# Patient Record
Sex: FEMALE | Race: BLACK OR AFRICAN AMERICAN | NOT HISPANIC OR LATINO | URBAN - METROPOLITAN AREA
[De-identification: names, ages, dates, MRNs, and addresses within clinical notes are randomized per-mention and may not be internally consistent; named-entity substitution may affect disease eponyms.]

---

## 2024-11-21 ENCOUNTER — OFFICE VISIT (OUTPATIENT)
Dept: URGENT CARE | Facility: CLINIC | Age: 44
End: 2024-11-21
Payer: COMMERCIAL

## 2024-11-21 VITALS
TEMPERATURE: 99 F | RESPIRATION RATE: 16 BRPM | HEIGHT: 63 IN | WEIGHT: 145 LBS | OXYGEN SATURATION: 98 % | DIASTOLIC BLOOD PRESSURE: 93 MMHG | HEART RATE: 74 BPM | BODY MASS INDEX: 25.69 KG/M2 | SYSTOLIC BLOOD PRESSURE: 142 MMHG

## 2024-11-21 DIAGNOSIS — N93.9 VAGINAL BLEEDING: ICD-10-CM

## 2024-11-21 DIAGNOSIS — Z20.2 POSSIBLE EXPOSURE TO STD: Primary | ICD-10-CM

## 2024-11-21 DIAGNOSIS — N94.9 GENITAL LESION, FEMALE: ICD-10-CM

## 2024-11-21 DIAGNOSIS — N89.8 VAGINAL DISCHARGE: ICD-10-CM

## 2024-11-21 DIAGNOSIS — J02.9 SORE THROAT: ICD-10-CM

## 2024-11-21 LAB
BILIRUBIN, UA POC OHS: NEGATIVE
BLOOD, UA POC OHS: ABNORMAL
CLARITY, UA POC OHS: CLEAR
COLOR, UA POC OHS: COLORLESS
GLUCOSE, UA POC OHS: NEGATIVE
KETONES, UA POC OHS: NEGATIVE
LEUKOCYTES, UA POC OHS: NEGATIVE
NITRITE, UA POC OHS: NEGATIVE
PH, UA POC OHS: 6.5
PROTEIN, UA POC OHS: NEGATIVE
SPECIFIC GRAVITY, UA POC OHS: 1.01
UROBILINOGEN, UA POC OHS: 0.2

## 2024-11-21 RX ORDER — CEFTRIAXONE 500 MG/1
500 INJECTION, POWDER, FOR SOLUTION INTRAMUSCULAR; INTRAVENOUS
Status: COMPLETED | OUTPATIENT
Start: 2024-11-21 | End: 2024-11-21

## 2024-11-21 RX ORDER — LIDOCAINE HYDROCHLORIDE 10 MG/ML
1 INJECTION, SOLUTION INFILTRATION; PERINEURAL
Status: COMPLETED | OUTPATIENT
Start: 2024-11-21 | End: 2024-11-21

## 2024-11-21 RX ORDER — DOXYCYCLINE 100 MG/1
100 CAPSULE ORAL 2 TIMES DAILY
Qty: 14 CAPSULE | Refills: 0 | Status: SHIPPED | OUTPATIENT
Start: 2024-11-21 | End: 2024-11-28

## 2024-11-21 RX ADMIN — CEFTRIAXONE 500 MG: 500 INJECTION, POWDER, FOR SOLUTION INTRAMUSCULAR; INTRAVENOUS at 09:11

## 2024-11-21 RX ADMIN — LIDOCAINE HYDROCHLORIDE 1 ML: 10 INJECTION, SOLUTION INFILTRATION; PERINEURAL at 09:11

## 2024-11-21 NOTE — PATIENT INSTRUCTIONS
Please follow up with your primary care provider within 2-5 days if your signs and symptoms have not resolved or worsen.     If your condition worsens or fails to improve we recommend that you receive another evaluation at the emergency room immediately or contact your primary medical clinic to discuss your concerns.   You must understand that you have received an Urgent Care treatment only and that you may be released before all of your medical problems are known or treated. You, the patient, will arrange for follow up care as instructed.     We will contact you in a few days with the results.      Increase condom use to prevent further occurance.  Notify sexual partners of the need for testing.  Complete ALL medication prescribed.  NO sexual intercourse for 7 days after treatment. Retest to ensure infection has cleared-there are infections that require more agressive treatment.  Retest for all in 6 weeks and again in 6 months to ensure true negative results.  Today's testing will give no crediable information if you have unprotected sexual activities going forward. Syphillis cases are rising!  Gonorrhea has RESISTANT strains which is why repeat testing after treatment is important.  Gonorrhea may be present in multiple sites from just ONE area of exposure.  For those who have high risk sexual behaviors and are on Truvada for PrEP- you have additional protection against HIV ONLY.    REMEMBER USE CONDOMS AND GET TESTED OFTEN.   You can get a reliable test result:   2 weeks: trichomonas, gonorrhea and chlamydia (and a pregnancy test too!)   1 week to 3 months: syphilis (RPR)  6 weeks to 3 months: HIV, Herpes, hepatitis C and B.    If you have negative tests please make sure to repeat testing in 3-6 months.

## 2024-11-21 NOTE — PROGRESS NOTES
"Subjective:      Patient ID: Marychuy Mcclendon is a 44 y.o. female.    Vitals:  height is 5' 3" (1.6 m) and weight is 65.8 kg (145 lb). Her oral temperature is 98.7 °F (37.1 °C). Her blood pressure is 142/93 (abnormal) and her pulse is 74. Her respiration is 16 and oxygen saturation is 98%.     Chief Complaint: Vaginal Discharge    Patient complaints: concerned about having an STD  -last month noticed external bumps on the external side of her vagina, dysuria and was also having white discharge; she self-treated with OTC yeast medication, cranberry juice and AZO; her symptoms resolved  - 4 days ago she started having an irritation in her throat and noticed patchy white on her tonsils; 2 days ago she noticed the vaginal bumps again and now has bloody discharge  -hx of STD in 2001 but doesn't remember what it was  - she used OTC yeast infection meds yesterday  -having vaginal irritation  -she states that she had her period a few weeks ago  -she admits to having oral sex    Vaginal Discharge  The patient's primary symptoms include genital itching, genital lesions and vaginal discharge. The patient's pertinent negatives include no genital odor. This is a recurrent problem. The current episode started in the past 7 days. The problem occurs constantly. The problem has been gradually worsening. The patient is experiencing no pain. She is not pregnant. Associated symptoms include rash and a sore throat. Pertinent negatives include no chills, dysuria, fever or hematuria. The vaginal discharge was bloody and white. The vaginal bleeding is spotting. She has not been passing clots. She has not been passing tissue. She has tried nothing for the symptoms. She is sexually active. It is unknown whether or not her partner has an STD. She uses nothing for contraception. Her past medical history is significant for an STD.       Constitution: Negative for chills and fever.   HENT:  Positive for sore throat and trouble swallowing.  "   Genitourinary:  Positive for vaginal discharge and genital sore. Negative for dysuria, hematuria and vaginal odor.   Skin:  Positive for rash.      Objective:     Physical Exam   HENT:   Mouth/Throat: Uvula is midline and mucous membranes are normal. Oropharyngeal exudate and posterior oropharyngeal erythema present. Tonsillar exudate.   White patches seen at the back of the pharynx      Comments: White patches seen at the back of the pharynx  Genitourinary:    Right adnexa and rectum normal.   There is lesion on the left labia. Cervix exhibits discharge.    Vaginal discharge and bleeding present.   There is bleeding in the vagina.         Comments: Blood thin vaginal discharge seen   Chaperone present: Reny RT.       Results for orders placed or performed in visit on 11/21/24   POCT Urinalysis(Instrument)    Collection Time: 11/21/24 10:19 AM   Result Value Ref Range    Color, POC UA Colorless Yellow, Straw, Colorless    Clarity, POC UA Clear Clear    Glucose, POC UA Negative Negative    Bilirubin, POC UA Negative Negative    Ketones, POC UA Negative Negative    Spec Grav POC UA 1.010 1.005 - 1.030    Blood, POC UA Trace-intact (A) Negative    pH, POC UA 6.5 5.0 - 8.0    Protein, POC UA Negative Negative    Urobilinogen, POC UA 0.2 <=1.0    Nitrite, POC UA Negative Negative    WBC, POC UA Negative Negative       Assessment:     1. Possible exposure to STD    2. Sore throat    3. Vaginal discharge    4. Vaginal bleeding    5. Genital lesion, female        Plan:       Possible exposure to STD  -     SureSwab(R)Chlamydia/N.Gonorrhoeae RNA,TMA  -     cefTRIAXone injection 500 mg  -     LIDOcaine HCL 10 mg/ml (1%) injection 1 mL  -     doxycycline (VIBRAMYCIN) 100 MG Cap; Take 1 capsule (100 mg total) by mouth 2 (two) times daily. for 7 days  Dispense: 14 capsule; Refill: 0  -     POCT Urinalysis(Instrument)    Sore throat  -     SureSwab(R)Chlamydia/N.Gonorrhoeae RNA,TMA  -     POCT  Urinalysis(Instrument)    Vaginal discharge  -     SureSwab(R)Chlamydia/N.Gonorrhoeae RNA,TMA  -     POCT Urinalysis(Instrument)    Vaginal bleeding  -     SureSwab(R)Chlamydia/N.Gonorrhoeae RNA,TMA  -     POCT Urinalysis(Instrument)    Genital lesion, female  -     HSV by Rapid PCR, Non-Blood Other; Swab; Future; Expected date: 11/21/2024  -     POCT Urinalysis(Instrument)    Sore throat- gonorrhea vs yeast      Medical Decision Making:   Differential Diagnosis:   Sore throat- gonorrhea versus Candida  Genital lesion- HSV versus folliculitis versus benign lesion  Clinical Tests:   Lab Tests: Ordered and Reviewed  The following lab test(s) were unremarkable: Urinalysis       <> Summary of Lab: GC ordered  HSV ordered    Urgent Care Management:  Patient complaints: concerned about having an STD  -last month noticed external bumps on the external side of her vagina, dysuria and was also having white discharge; she self-treated with OTC yeast medication, cranberry juice and AZO; her symptoms resolved  - 4 days ago she started having an irritation in her throat and noticed patchy white on her tonsils; 2 days ago she noticed the vaginal bumps again and now has bloody discharge  -hx of STD in 2001 but doesn't remember what it was  - she used OTC yeast infection meds yesterday  -having vaginal irritation  -she states that she had her period a few weeks ago  -she admits to having oral sex.  Vitals are within normal limits.  Physical Exam   HENT:   Mouth/Throat: Uvula is midline and mucous membranes are normal. Oropharyngeal exudate and posterior oropharyngeal erythema present. Tonsillar exudate.   White patches seen at the back of the pharynx      Comments: White patches seen at the back of the pharynx  Genitourinary:    Right adnexa and rectum normal.   There is lesion on the left labia. Cervix exhibits discharge.    Vaginal discharge and bleeding present.   There is bleeding in the vagina.         Comments: Blood thin  vaginal discharge seen   Chaperone present: RT Reny.   The patient was treated for suspected gonorrhea and chlamydia.  I will follow up with the patient once results come back.  ED and return precautions were given. The pt vu.       Please follow up with your primary care provider within 2-5 days if your signs and symptoms have not resolved or worsen.     If your condition worsens or fails to improve we recommend that you receive another evaluation at the emergency room immediately or contact your primary medical clinic to discuss your concerns.   You must understand that you have received an Urgent Care treatment only and that you may be released before all of your medical problems are known or treated. You, the patient, will arrange for follow up care as instructed.     We will contact you in a few days with the results.      Increase condom use to prevent further occurance.  Notify sexual partners of the need for testing.  Complete ALL medication prescribed.  NO sexual intercourse for 7 days after treatment. Retest to ensure infection has cleared-there are infections that require more agressive treatment.  Retest for all in 6 weeks and again in 6 months to ensure true negative results.  Today's testing will give no crediable information if you have unprotected sexual activities going forward. Syphillis cases are rising!  Gonorrhea has RESISTANT strains which is why repeat testing after treatment is important.  Gonorrhea may be present in multiple sites from just ONE area of exposure.  For those who have high risk sexual behaviors and are on Truvada for PrEP- you have additional protection against HIV ONLY.    REMEMBER USE CONDOMS AND GET TESTED OFTEN.   You can get a reliable test result:   2 weeks: trichomonas, gonorrhea and chlamydia (and a pregnancy test too!)   1 week to 3 months: syphilis (RPR)  6 weeks to 3 months: HIV, Herpes, hepatitis C and B.    If you have negative tests please make sure to repeat  testing in 3-6 months.

## 2024-11-23 ENCOUNTER — PATIENT MESSAGE (OUTPATIENT)
Dept: URGENT CARE | Facility: CLINIC | Age: 44
End: 2024-11-23
Payer: COMMERCIAL

## 2024-11-23 ENCOUNTER — TELEPHONE (OUTPATIENT)
Dept: URGENT CARE | Facility: CLINIC | Age: 44
End: 2024-11-23
Payer: COMMERCIAL

## 2024-11-23 DIAGNOSIS — B37.0 CANDIDA INFECTION, ORAL: Primary | ICD-10-CM

## 2024-11-23 LAB
APTIMA MEDIA TYPE: NORMAL
C. TRACHOMATIS DNA PROBE RESULT:: NEGATIVE
N. GONORRHOEAE DNA PROBE RESULT:: NEGATIVE
SPECIMEN SOURCE: NORMAL

## 2024-11-23 RX ORDER — NYSTATIN 100000 [USP'U]/ML
6 SUSPENSION ORAL 4 TIMES DAILY
Qty: 240 ML | Refills: 0 | Status: SHIPPED | OUTPATIENT
Start: 2024-11-23 | End: 2024-12-03

## 2024-11-23 NOTE — TELEPHONE ENCOUNTER
Left  informing the patient that I sent her a "[employer] message about some of her lab results and a second "[employer] message about a new medication sent to [facility] pharmacy. I asked her to call our clinic if she has any additional questions or concerns.

## 2024-11-25 ENCOUNTER — PATIENT MESSAGE (OUTPATIENT)
Dept: URGENT CARE | Facility: CLINIC | Age: 44
End: 2024-11-25
Payer: COMMERCIAL

## 2024-11-25 ENCOUNTER — TELEPHONE (OUTPATIENT)
Dept: URGENT CARE | Facility: CLINIC | Age: 44
End: 2024-11-25
Payer: COMMERCIAL

## 2024-12-19 ENCOUNTER — OFFICE VISIT (OUTPATIENT)
Dept: URGENT CARE | Facility: CLINIC | Age: 44
End: 2024-12-19
Payer: COMMERCIAL

## 2024-12-19 VITALS
SYSTOLIC BLOOD PRESSURE: 167 MMHG | WEIGHT: 145 LBS | DIASTOLIC BLOOD PRESSURE: 103 MMHG | TEMPERATURE: 98 F | BODY MASS INDEX: 26.68 KG/M2 | HEIGHT: 62 IN | OXYGEN SATURATION: 97 % | RESPIRATION RATE: 16 BRPM | HEART RATE: 65 BPM

## 2024-12-19 DIAGNOSIS — M54.6 ACUTE MIDLINE THORACIC BACK PAIN: Primary | ICD-10-CM

## 2024-12-19 PROCEDURE — 96372 THER/PROPH/DIAG INJ SC/IM: CPT | Mod: S$GLB,,, | Performed by: STUDENT IN AN ORGANIZED HEALTH CARE EDUCATION/TRAINING PROGRAM

## 2024-12-19 PROCEDURE — 99213 OFFICE O/P EST LOW 20 MIN: CPT | Mod: 25,S$GLB,, | Performed by: STUDENT IN AN ORGANIZED HEALTH CARE EDUCATION/TRAINING PROGRAM

## 2024-12-19 RX ORDER — KETOROLAC TROMETHAMINE 10 MG/1
10 TABLET, FILM COATED ORAL EVERY 6 HOURS PRN
Qty: 20 TABLET | Refills: 0 | Status: SHIPPED | OUTPATIENT
Start: 2024-12-19 | End: 2024-12-24

## 2024-12-19 RX ORDER — KETOROLAC TROMETHAMINE 30 MG/ML
30 INJECTION, SOLUTION INTRAMUSCULAR; INTRAVENOUS
Status: COMPLETED | OUTPATIENT
Start: 2024-12-19 | End: 2024-12-19

## 2024-12-19 RX ORDER — CYCLOBENZAPRINE HCL 10 MG
10 TABLET ORAL NIGHTLY PRN
Qty: 10 TABLET | Refills: 0 | Status: SHIPPED | OUTPATIENT
Start: 2024-12-19 | End: 2024-12-29

## 2024-12-19 RX ORDER — LIDOCAINE 50 MG/G
1 PATCH TOPICAL DAILY
Qty: 5 PATCH | Refills: 0 | Status: SHIPPED | OUTPATIENT
Start: 2024-12-19 | End: 2024-12-24

## 2024-12-19 RX ADMIN — KETOROLAC TROMETHAMINE 30 MG: 30 INJECTION, SOLUTION INTRAMUSCULAR; INTRAVENOUS at 01:12

## 2024-12-19 NOTE — PATIENT INSTRUCTIONS
Please follow up with your primary care provider within 2-5 days if your signs and symptoms have not resolved or worsen.     If your condition worsens or fails to improve we recommend that you receive another evaluation at the emergency room immediately or contact your primary medical clinic to discuss your concerns.   You must understand that you have received an Urgent Care treatment only and that you may be released before all of your medical problems are known or treated. You, the patient, will arrange for follow up care as instructed.          If the medication does not help with the back pain then please return for a back xray. You can continue to use heat and ice as needed for pain. You can also apply the lidocaine patches in the area that is painful as needed.

## 2024-12-19 NOTE — PROGRESS NOTES
"Subjective:      Patient ID: Marychuy Mcclendon is a 44 y.o. female.    Vitals:  height is 5' 2" (1.575 m) and weight is 65.8 kg (145 lb). Her temperature is 97.8 °F (36.6 °C). Her blood pressure is 167/103 (abnormal) and her pulse is 65. Her respiration is 16 and oxygen saturation is 97%.     Chief Complaint: Back Pain    Pt states she is having back pain and thinks she pulled something. She was also seen last month for possible std and everything came back negative, she said the symptoms have gone away but want to know what her dx is. Two weeks ago lifted up a bed and one week later her back started to hurting.       Back Pain  This is a new problem. The current episode started 1 to 4 weeks ago. The pain is present in the thoracic spine. The quality of the pain is described as burning and stabbing. The pain is at a severity of 8/10. The pain is moderate. The pain is Worse during the day. Stiffness is present In the morning. Pertinent negatives include no abdominal pain, dysuria or fever. She has tried heat, NSAIDs and ice for the symptoms. The treatment provided moderate relief.     Constitution: Negative for chills and fever.   Gastrointestinal:  Negative for abdominal pain, nausea and vomiting.   Genitourinary:  Negative for dysuria, hematuria, vaginal pain, vaginal discharge, vaginal bleeding, vaginal odor and genital sore.   Musculoskeletal:  Positive for pain, back pain and muscle ache.      Objective:     Physical Exam   Musculoskeletal:      Thoracic back: She exhibits tenderness and spasm.        Back:       Comments: Tenderness to palpation of the midline thoracic spine  The patient was not able to flex her back due to pain  The patient was also having difficulty with sitting down for long period of time due to back pain       Assessment:     1. Acute midline thoracic back pain        Plan:       Acute midline thoracic back pain  -     ketorolac injection 30 mg  -     ketorolac (TORADOL) 10 mg tablet; " Take 1 tablet (10 mg total) by mouth every 6 (six) hours as needed for Pain.  Dispense: 20 tablet; Refill: 0  -     cyclobenzaprine (FLEXERIL) 10 MG tablet; Take 1 tablet (10 mg total) by mouth nightly as needed for Muscle spasms.  Dispense: 10 tablet; Refill: 0  -     LIDOcaine (LIDODERM) 5 %; Place 1 patch onto the skin once daily. Remove & Discard patch within 12 hours or as directed by MD for 5 days  Dispense: 5 patch; Refill: 0      Please follow up with your primary care provider within 2-5 days if your signs and symptoms have not resolved or worsen.     If your condition worsens or fails to improve we recommend that you receive another evaluation at the emergency room immediately or contact your primary medical clinic to discuss your concerns.   You must understand that you have received an Urgent Care treatment only and that you may be released before all of your medical problems are known or treated. You, the patient, will arrange for follow up care as instructed.          If the medication does not help with the back pain then please return for a back xray. You can continue to use heat and ice as needed for pain. You can also apply the lidocaine patches in the area that is painful as needed.    Medical Decision Making:   Differential Diagnosis:   Acute midline lower backpain  Urgent Care Management:  Pt states she is having back pain and thinks she pulled something. She was also seen last month for possible std and everything came back negative, she said the symptoms have gone away but want to know what her dx is. Two weeks ago lifted up a bed and one week later her back started to hurting.   Vitals WNL except for a BP of 167/103.   Physical Exam   Musculoskeletal:      Thoracic back: She exhibits tenderness and spasm.        Back:   Comments: Tenderness to palpation of the midline thoracic spine  The patient was not able to flex her back due to pain  The patient was also having difficulty with sitting  down for long period of time due to back pain   Pt was given a toradol shot in office. She was sent home with PO toradol, lidocaine patches, and a muscle relaxer. ED and return precautions were given. The pt mary ellen.